# Patient Record
Sex: FEMALE | Race: WHITE
[De-identification: names, ages, dates, MRNs, and addresses within clinical notes are randomized per-mention and may not be internally consistent; named-entity substitution may affect disease eponyms.]

---

## 2018-06-29 ENCOUNTER — HOSPITAL ENCOUNTER (OUTPATIENT)
Age: 63
Setting detail: OBSERVATION
LOS: 1 days | Discharge: HOME | End: 2018-06-30

## 2018-06-29 ENCOUNTER — HOSPITAL ENCOUNTER (OUTPATIENT)
Dept: HOSPITAL 91 - SDS | Age: 63
Setting detail: OBSERVATION
LOS: 1 days | Discharge: HOME | End: 2018-06-30
Payer: COMMERCIAL

## 2018-06-29 DIAGNOSIS — D05.12: Primary | ICD-10-CM

## 2018-06-29 LAB
ADD MAN DIFF?: NO
ALANINE AMINOTRANSFERASE: 22 IU/L (ref 13–69)
ALBUMIN/GLOBULIN RATIO: 1.13
ALBUMIN: 4.2 G/DL (ref 3.3–4.9)
ALKALINE PHOSPHATASE: 66 IU/L (ref 42–121)
ANION GAP: 16 (ref 8–16)
ASPARTATE AMINO TRANSFERASE: 22 IU/L (ref 15–46)
BASOPHIL #: 0 10^3/UL (ref 0–0.1)
BASOPHILS %: 0.3 % (ref 0–2)
BILIRUBIN,DIRECT: 0 MG/DL (ref 0–0.2)
BILIRUBIN,TOTAL: 0.8 MG/DL (ref 0.2–1.3)
BLOOD UREA NITROGEN: 15 MG/DL (ref 7–20)
CALCIUM: 9.1 MG/DL (ref 8.4–10.2)
CARBON DIOXIDE: 25 MMOL/L (ref 21–31)
CHLORIDE: 108 MMOL/L (ref 97–110)
CREATININE: 0.52 MG/DL (ref 0.44–1)
EOSINOPHILS #: 0.1 10^3/UL (ref 0–0.5)
EOSINOPHILS %: 1.3 % (ref 0–7)
GLOBULIN: 3.7 G/DL (ref 1.3–3.2)
GLUCOSE: 86 MG/DL (ref 70–220)
HEMATOCRIT: 37.2 % (ref 37–47)
HEMOGLOBIN: 12.9 G/DL (ref 12–16)
HOLD TRANSMISSIONS: 1
INR: 0.93
LYMPHOCYTES #: 1.2 10^3/UL (ref 0.8–2.9)
LYMPHOCYTES %: 31.5 % (ref 15–51)
MEAN CORPUSCULAR HEMOGLOBIN: 31.3 PG (ref 29–33)
MEAN CORPUSCULAR HGB CONC: 34.7 G/DL (ref 32–37)
MEAN CORPUSCULAR VOLUME: 90.3 FL (ref 82–101)
MEAN PLATELET VOLUME: 10.2 FL (ref 7.4–10.4)
MONOCYTE #: 0.3 10^3/UL (ref 0.3–0.9)
MONOCYTES %: 7.6 % (ref 0–11)
NEUTROPHIL #: 2.3 10^3/UL (ref 1.6–7.5)
NEUTROPHILS %: 59 % (ref 39–77)
NUCLEATED RED BLOOD CELLS #: 0 10^3/UL (ref 0–0)
NUCLEATED RED BLOOD CELLS%: 0 /100WBC (ref 0–0)
PARTIAL THROMBOPLASTIN TIME: 27.5 SEC (ref 25–35)
PLATELET COUNT: 202 10^3/UL (ref 140–415)
POTASSIUM: 3.5 MMOL/L (ref 3.5–5.1)
PROTIME: 12.5 SEC (ref 11.9–14.9)
PT RATIO: 1
RED BLOOD COUNT: 4.12 10^6/UL (ref 4.2–5.4)
RED CELL DISTRIBUTION WIDTH: 12.2 % (ref 11.5–14.5)
SODIUM: 145 MMOL/L (ref 135–144)
TOTAL PROTEIN: 7.9 G/DL (ref 6.1–8.1)
WHITE BLOOD COUNT: 3.8 10^3/UL (ref 4.8–10.8)

## 2018-06-29 PROCEDURE — 85610 PROTHROMBIN TIME: CPT

## 2018-06-29 PROCEDURE — 88342 IMHCHEM/IMCYTCHM 1ST ANTB: CPT

## 2018-06-29 PROCEDURE — 88307 TISSUE EXAM BY PATHOLOGIST: CPT

## 2018-06-29 PROCEDURE — 88331 PATH CONSLTJ SURG 1 BLK 1SPC: CPT

## 2018-06-29 PROCEDURE — 19301 PARTIAL MASTECTOMY: CPT

## 2018-06-29 PROCEDURE — 93005 ELECTROCARDIOGRAM TRACING: CPT

## 2018-06-29 PROCEDURE — 71045 X-RAY EXAM CHEST 1 VIEW: CPT

## 2018-06-29 PROCEDURE — 85730 THROMBOPLASTIN TIME PARTIAL: CPT

## 2018-06-29 PROCEDURE — 85025 COMPLETE CBC W/AUTO DIFF WBC: CPT

## 2018-06-29 PROCEDURE — 80053 COMPREHEN METABOLIC PANEL: CPT

## 2018-06-29 RX ADMIN — HYDROMORPHONE HYDROCHLORIDE 1 MG: 2 INJECTION INTRAMUSCULAR; INTRAVENOUS; SUBCUTANEOUS at 19:37

## 2018-06-29 RX ADMIN — ONDANSETRON HYDROCHLORIDE 1 MG: 2 INJECTION, SOLUTION INTRAMUSCULAR; INTRAVENOUS at 19:37

## 2018-06-29 RX ADMIN — CEFAZOLIN SODIUM 1 MLS/HR: 2 SOLUTION INTRAVENOUS at 06:00

## 2018-06-29 RX ADMIN — HYDROMORPHONE HYDROCHLORIDE 1 MG: 2 INJECTION INTRAMUSCULAR; INTRAVENOUS; SUBCUTANEOUS at 19:09

## 2018-06-29 RX ADMIN — DEXTROSE, SODIUM CHLORIDE, AND POTASSIUM CHLORIDE 1 MLS/HR: 5; .45; .15 INJECTION INTRAVENOUS at 21:11

## 2018-06-29 RX ADMIN — THIAMINE HYDROCHLORIDE 1 MLS/HR: 100 INJECTION, SOLUTION INTRAMUSCULAR; INTRAVENOUS at 19:20

## 2018-06-29 RX ADMIN — THIAMINE HYDROCHLORIDE 1 MLS/HR: 100 INJECTION, SOLUTION INTRAMUSCULAR; INTRAVENOUS at 06:00

## 2018-06-29 RX ADMIN — MORPHINE SULFATE 1 MG: 2 INJECTION, SOLUTION INTRAMUSCULAR; INTRAVENOUS at 21:14

## 2018-06-30 RX ADMIN — DEXTROSE, SODIUM CHLORIDE, AND POTASSIUM CHLORIDE 1 MLS/HR: 5; .45; .15 INJECTION INTRAVENOUS at 10:28

## 2018-06-30 RX ADMIN — MORPHINE SULFATE 1 MG: 2 INJECTION, SOLUTION INTRAMUSCULAR; INTRAVENOUS at 04:52

## 2018-06-30 RX ADMIN — DEXTROSE, SODIUM CHLORIDE, AND POTASSIUM CHLORIDE 1 MLS/HR: 5; .45; .15 INJECTION INTRAVENOUS at 02:28

## 2018-06-30 RX ADMIN — MORPHINE SULFATE 1 MG: 2 INJECTION, SOLUTION INTRAMUSCULAR; INTRAVENOUS at 10:50

## 2018-06-30 RX ADMIN — DEXTROSE, SODIUM CHLORIDE, AND POTASSIUM CHLORIDE 1 MLS/HR: 5; .45; .15 INJECTION INTRAVENOUS at 04:53

## 2018-09-13 ENCOUNTER — HOSPITAL ENCOUNTER (OUTPATIENT)
Age: 63
Discharge: HOME | End: 2018-09-13

## 2018-09-13 ENCOUNTER — HOSPITAL ENCOUNTER (OUTPATIENT)
Dept: HOSPITAL 91 - SDS | Age: 63
Discharge: HOME | End: 2018-09-13
Payer: COMMERCIAL

## 2018-09-13 DIAGNOSIS — C50.912: Primary | ICD-10-CM

## 2018-09-13 PROCEDURE — 36561 INSERT TUNNELED CV CATH: CPT

## 2018-09-13 PROCEDURE — 71045 X-RAY EXAM CHEST 1 VIEW: CPT

## 2018-09-13 PROCEDURE — 93306 TTE W/DOPPLER COMPLETE: CPT

## 2018-09-13 PROCEDURE — 76942 ECHO GUIDE FOR BIOPSY: CPT

## 2018-09-13 RX ADMIN — THIAMINE HYDROCHLORIDE 1 MLS/HR: 100 INJECTION, SOLUTION INTRAMUSCULAR; INTRAVENOUS at 11:00

## 2018-09-13 RX ADMIN — CEFAZOLIN 1 ML/HR: 1 INJECTION, POWDER, FOR SOLUTION INTRAMUSCULAR; INTRAVENOUS at 11:30

## 2018-09-13 RX ADMIN — CEFAZOLIN 1 MLS/HR: 1 INJECTION, POWDER, FOR SOLUTION INTRAMUSCULAR; INTRAVENOUS at 09:00

## 2018-09-13 RX ADMIN — FENTANYL CITRATE 1 MCG: 50 INJECTION, SOLUTION INTRAMUSCULAR; INTRAVENOUS at 12:00

## 2018-09-13 RX ADMIN — HEPARIN SODIUM IN SODIUM CHLORIDE 1: 200 INJECTION INTRAVENOUS at 12:15

## 2018-09-13 RX ADMIN — MIDAZOLAM HYDROCHLORIDE 1 MG: 2 INJECTION, SOLUTION INTRAMUSCULAR; INTRAVENOUS at 12:10

## 2018-09-13 RX ADMIN — BACITRACIN 1 ML: 50000 INJECTION, POWDER, FOR SOLUTION INTRAMUSCULAR at 12:00

## 2018-09-13 RX ADMIN — HEPARIN SODIUM 1 UNIT: 1000 INJECTION, SOLUTION INTRAVENOUS; SUBCUTANEOUS at 12:30

## 2018-09-13 RX ADMIN — LIDOCAINE HYDROCHLORIDE 1 ML: 10; .005 INJECTION, SOLUTION EPIDURAL; INFILTRATION; INTRACAUDAL; PERINEURAL at 12:10

## 2019-05-30 NOTE — PREOPHP
DATE OF ADMISSION: 05/31/2019

 

HISTORY OF PRESENT ILLNESS:  The patient is a 64-year-old female in overall good

health who was scheduled to undergo a left modified radical mastectomy and right

total mastectomy.  The patient presented last year with invasive ductal 

carcinoma of the left breast and on 06/29/2018 underwent left partial mastectomy

with negative sentinel lymph node for metastatic disease.  She has been 

receiving Herceptin every 3 weeks.  She received postoperative radiation 

therapy.  The patient had recent followup imaging showing there are residual 

calcifications with a span of approximately 7 cm in the left breast which has 

been operated on.  After appropriate consultations, decision and recommendation 

was made for left modified radical mastectomy and because the patient has a 

strong family history of breast cancer, she requested bilateral mastectomy to be

performed without reconstruction.

 

PAST MEDICAL HISTORY:

MEDICATIONS:  Herceptin every 3 weeks.

 

ALLERGIES:  NONE.

 

PAST SURGICAL HISTORY:  In addition to the above 15 years ago, she underwent 

hysterectomy and remotely, she underwent appendectomy.

 

PHYSICAL EXAMINATION:

VITAL SIGNS:  The patient is 5-foot 3 inches, 135 pounds.  Vital signs are 

within normal limits.

HEENT:  Within normal limits.

CHEST:  Clear.

HEART:  Regular rate, rhythm.

BREASTS:  Large and ptotic.  There is mild residual radiation dermatitis of the 

left breast.  There is no palpable mass or any enlarged axillary, 

supraclavicular lymph nodes.

ABDOMEN:  Soft.

PELVIC AND RECTAL:  Per primary care.

EXTREMITIES:  Without edema.

NEUROLOGIC:  Physiologic.

 

ASSESSMENT:

1.  Invasive ductal carcinoma of left breast, status post left partial 

mastectomy and negative sentinel node biopsy in 06/2018, now with extensive 

residual calcifications.

2.  Strong family history of breast cancer.

 

PLAN:  Left modified radical mastectomy and right total mastectomy without 

reconstruction.  Full discussion has been had with the patient regarding the 

nature of the condition, the nature of the surgery, indications, alternatives, 

options and risks including bleeding, infection, ischemia of flaps, injury to 

adjacent structures or organs, need for additional procedures and treatments  

based on final pathology, need for drains, etc. All questions have been 

answered.  She understands and agrees to proceed.

 

 

Dictated By: JOSH SALGADO/FLORINA

DD:    05/30/2019 12:04:37

DT:    05/30/2019 13:21:21

Conf#: [id number]

DID#:  [id number]

 

DINORAH

## 2019-05-31 ENCOUNTER — HOSPITAL ENCOUNTER (INPATIENT)
Dept: HOSPITAL 91 - REC | Age: 64
LOS: 2 days | Discharge: HOME | DRG: 581 | End: 2019-06-02
Payer: COMMERCIAL

## 2019-05-31 ENCOUNTER — HOSPITAL ENCOUNTER (INPATIENT)
Dept: HOSPITAL 10 - REC | Age: 64
LOS: 2 days | Discharge: HOME | DRG: 581 | End: 2019-06-02
Attending: SURGERY | Admitting: SURGERY
Payer: COMMERCIAL

## 2019-05-31 VITALS — SYSTOLIC BLOOD PRESSURE: 130 MMHG | HEART RATE: 79 BPM | RESPIRATION RATE: 22 BRPM | DIASTOLIC BLOOD PRESSURE: 66 MMHG

## 2019-05-31 VITALS — RESPIRATION RATE: 14 BRPM | DIASTOLIC BLOOD PRESSURE: 70 MMHG | SYSTOLIC BLOOD PRESSURE: 107 MMHG | HEART RATE: 56 BPM

## 2019-05-31 VITALS — DIASTOLIC BLOOD PRESSURE: 59 MMHG | RESPIRATION RATE: 12 BRPM | HEART RATE: 56 BPM | SYSTOLIC BLOOD PRESSURE: 103 MMHG

## 2019-05-31 VITALS — DIASTOLIC BLOOD PRESSURE: 63 MMHG | RESPIRATION RATE: 16 BRPM | SYSTOLIC BLOOD PRESSURE: 116 MMHG | HEART RATE: 61 BPM

## 2019-05-31 VITALS — RESPIRATION RATE: 17 BRPM | SYSTOLIC BLOOD PRESSURE: 114 MMHG | HEART RATE: 59 BPM | DIASTOLIC BLOOD PRESSURE: 61 MMHG

## 2019-05-31 VITALS — DIASTOLIC BLOOD PRESSURE: 53 MMHG | SYSTOLIC BLOOD PRESSURE: 100 MMHG | RESPIRATION RATE: 19 BRPM | HEART RATE: 60 BPM

## 2019-05-31 VITALS — SYSTOLIC BLOOD PRESSURE: 116 MMHG | HEART RATE: 58 BPM | DIASTOLIC BLOOD PRESSURE: 68 MMHG | RESPIRATION RATE: 16 BRPM

## 2019-05-31 VITALS — SYSTOLIC BLOOD PRESSURE: 106 MMHG | RESPIRATION RATE: 12 BRPM | HEART RATE: 62 BPM | DIASTOLIC BLOOD PRESSURE: 63 MMHG

## 2019-05-31 VITALS — SYSTOLIC BLOOD PRESSURE: 107 MMHG | HEART RATE: 58 BPM | RESPIRATION RATE: 13 BRPM | DIASTOLIC BLOOD PRESSURE: 61 MMHG

## 2019-05-31 VITALS — DIASTOLIC BLOOD PRESSURE: 59 MMHG | HEART RATE: 59 BPM | SYSTOLIC BLOOD PRESSURE: 98 MMHG | RESPIRATION RATE: 17 BRPM

## 2019-05-31 VITALS — DIASTOLIC BLOOD PRESSURE: 68 MMHG | RESPIRATION RATE: 12 BRPM | HEART RATE: 62 BPM | SYSTOLIC BLOOD PRESSURE: 106 MMHG

## 2019-05-31 VITALS — SYSTOLIC BLOOD PRESSURE: 76 MMHG | DIASTOLIC BLOOD PRESSURE: 58 MMHG | RESPIRATION RATE: 16 BRPM | HEART RATE: 64 BPM

## 2019-05-31 VITALS — SYSTOLIC BLOOD PRESSURE: 110 MMHG | DIASTOLIC BLOOD PRESSURE: 55 MMHG | HEART RATE: 62 BPM | RESPIRATION RATE: 16 BRPM

## 2019-05-31 VITALS — DIASTOLIC BLOOD PRESSURE: 68 MMHG | SYSTOLIC BLOOD PRESSURE: 115 MMHG | RESPIRATION RATE: 15 BRPM | HEART RATE: 59 BPM

## 2019-05-31 VITALS — DIASTOLIC BLOOD PRESSURE: 66 MMHG | HEART RATE: 54 BPM | RESPIRATION RATE: 14 BRPM | SYSTOLIC BLOOD PRESSURE: 116 MMHG

## 2019-05-31 VITALS — HEART RATE: 65 BPM | SYSTOLIC BLOOD PRESSURE: 66 MMHG | RESPIRATION RATE: 16 BRPM | DIASTOLIC BLOOD PRESSURE: 48 MMHG

## 2019-05-31 VITALS — RESPIRATION RATE: 23 BRPM | DIASTOLIC BLOOD PRESSURE: 67 MMHG | HEART RATE: 70 BPM | SYSTOLIC BLOOD PRESSURE: 127 MMHG

## 2019-05-31 VITALS — RESPIRATION RATE: 16 BRPM | HEART RATE: 62 BPM | DIASTOLIC BLOOD PRESSURE: 59 MMHG | SYSTOLIC BLOOD PRESSURE: 112 MMHG

## 2019-05-31 VITALS — RESPIRATION RATE: 16 BRPM | SYSTOLIC BLOOD PRESSURE: 113 MMHG | DIASTOLIC BLOOD PRESSURE: 65 MMHG | HEART RATE: 58 BPM

## 2019-05-31 VITALS — DIASTOLIC BLOOD PRESSURE: 60 MMHG | RESPIRATION RATE: 10 BRPM | SYSTOLIC BLOOD PRESSURE: 98 MMHG | HEART RATE: 52 BPM

## 2019-05-31 VITALS — HEART RATE: 63 BPM | DIASTOLIC BLOOD PRESSURE: 58 MMHG | RESPIRATION RATE: 16 BRPM | SYSTOLIC BLOOD PRESSURE: 110 MMHG

## 2019-05-31 VITALS — RESPIRATION RATE: 16 BRPM | HEART RATE: 65 BPM | SYSTOLIC BLOOD PRESSURE: 67 MMHG | DIASTOLIC BLOOD PRESSURE: 39 MMHG

## 2019-05-31 VITALS — RESPIRATION RATE: 16 BRPM | SYSTOLIC BLOOD PRESSURE: 103 MMHG | DIASTOLIC BLOOD PRESSURE: 62 MMHG | HEART RATE: 65 BPM

## 2019-05-31 VITALS
BODY MASS INDEX: 24.49 KG/M2 | HEIGHT: 63 IN | BODY MASS INDEX: 24.49 KG/M2 | HEIGHT: 63 IN | WEIGHT: 138.23 LBS | WEIGHT: 138.23 LBS

## 2019-05-31 VITALS — DIASTOLIC BLOOD PRESSURE: 67 MMHG | SYSTOLIC BLOOD PRESSURE: 118 MMHG | RESPIRATION RATE: 16 BRPM | HEART RATE: 62 BPM

## 2019-05-31 VITALS — DIASTOLIC BLOOD PRESSURE: 60 MMHG | HEART RATE: 56 BPM | RESPIRATION RATE: 10 BRPM | SYSTOLIC BLOOD PRESSURE: 116 MMHG

## 2019-05-31 VITALS — RESPIRATION RATE: 24 BRPM | SYSTOLIC BLOOD PRESSURE: 106 MMHG | DIASTOLIC BLOOD PRESSURE: 57 MMHG | HEART RATE: 59 BPM

## 2019-05-31 VITALS — HEART RATE: 74 BPM | RESPIRATION RATE: 13 BRPM | SYSTOLIC BLOOD PRESSURE: 125 MMHG | DIASTOLIC BLOOD PRESSURE: 61 MMHG

## 2019-05-31 VITALS — HEART RATE: 72 BPM | SYSTOLIC BLOOD PRESSURE: 107 MMHG | RESPIRATION RATE: 12 BRPM | DIASTOLIC BLOOD PRESSURE: 68 MMHG

## 2019-05-31 VITALS — HEART RATE: 73 BPM | DIASTOLIC BLOOD PRESSURE: 69 MMHG | RESPIRATION RATE: 16 BRPM | SYSTOLIC BLOOD PRESSURE: 103 MMHG

## 2019-05-31 DIAGNOSIS — C50.912: Primary | ICD-10-CM

## 2019-05-31 DIAGNOSIS — Z80.3: ICD-10-CM

## 2019-05-31 DIAGNOSIS — Z90.710: ICD-10-CM

## 2019-05-31 DIAGNOSIS — Z40.01: ICD-10-CM

## 2019-05-31 LAB
ADD MAN DIFF?: NO
ALANINE AMINOTRANSFERASE: 20 IU/L (ref 13–69)
ALBUMIN/GLOBULIN RATIO: 1.13
ALBUMIN: 4.1 G/DL (ref 3.3–4.9)
ALKALINE PHOSPHATASE: 50 IU/L (ref 42–121)
ANION GAP: 7 (ref 5–13)
ASPARTATE AMINO TRANSFERASE: 21 IU/L (ref 15–46)
BASOPHIL #: 0 10^3/UL (ref 0–0.1)
BASOPHILS %: 0.4 % (ref 0–2)
BILIRUBIN,DIRECT: 0 MG/DL (ref 0–0.2)
BILIRUBIN,TOTAL: 0.7 MG/DL (ref 0.2–1.3)
BLOOD UREA NITROGEN: 16 MG/DL (ref 7–20)
CALCIUM: 9.3 MG/DL (ref 8.4–10.2)
CARBON DIOXIDE: 29 MMOL/L (ref 21–31)
CHLORIDE: 107 MMOL/L (ref 97–110)
CREATININE: 0.48 MG/DL (ref 0.44–1)
EOSINOPHILS #: 0 10^3/UL (ref 0–0.5)
EOSINOPHILS %: 1.5 % (ref 0–7)
GLOBULIN: 3.6 G/DL (ref 1.3–3.2)
GLUCOSE: 89 MG/DL (ref 70–220)
HEMATOCRIT: 35.8 % (ref 37–47)
HEMOGLOBIN: 12.2 G/DL (ref 12–16)
HOLD TRANSMISSIONS: 1
IMMATURE GRANS #M: 0 10^3/UL (ref 0–0.03)
IMMATURE GRANS % (M): 0 % (ref 0–0.43)
INR: 0.89
LYMPHOCYTES #: 0.9 10^3/UL (ref 0.8–2.9)
LYMPHOCYTES %: 32.6 % (ref 15–51)
MEAN CORPUSCULAR HEMOGLOBIN: 31.1 PG (ref 29–33)
MEAN CORPUSCULAR HGB CONC: 34.1 G/DL (ref 32–37)
MEAN CORPUSCULAR VOLUME: 91.3 FL (ref 82–101)
MEAN PLATELET VOLUME: 10 FL (ref 7.4–10.4)
MONOCYTE #: 0.3 10^3/UL (ref 0.3–0.9)
MONOCYTES %: 10.6 % (ref 0–11)
NEUTROPHIL #: 1.5 10^3/UL (ref 1.6–7.5)
NEUTROPHILS %: 54.9 % (ref 39–77)
NUCLEATED RED BLOOD CELLS #: 0 10^3/UL (ref 0–0)
NUCLEATED RED BLOOD CELLS%: 0 /100WBC (ref 0–0)
PARTIAL THROMBOPLASTIN TIME: 26.9 SEC (ref 23–35)
PLATELET COUNT: 194 10^3/UL (ref 140–415)
POTASSIUM: 4.1 MMOL/L (ref 3.5–5.1)
PROTIME: 12.1 SEC (ref 11.9–14.9)
PT RATIO: 0.9
RED BLOOD COUNT: 3.92 10^6/UL (ref 4.2–5.4)
RED CELL DISTRIBUTION WIDTH: 12.4 % (ref 11.5–14.5)
SODIUM: 143 MMOL/L (ref 135–144)
TOTAL PROTEIN: 7.7 G/DL (ref 6.1–8.1)
WHITE BLOOD COUNT: 2.7 10^3/UL (ref 4.8–10.8)

## 2019-05-31 PROCEDURE — 0HTV0ZZ RESECTION OF BILATERAL BREAST, OPEN APPROACH: ICD-10-PCS

## 2019-05-31 PROCEDURE — 85025 COMPLETE CBC W/AUTO DIFF WBC: CPT

## 2019-05-31 PROCEDURE — 85610 PROTHROMBIN TIME: CPT

## 2019-05-31 PROCEDURE — 85730 THROMBOPLASTIN TIME PARTIAL: CPT

## 2019-05-31 PROCEDURE — 93005 ELECTROCARDIOGRAM TRACING: CPT

## 2019-05-31 PROCEDURE — 07T60ZZ RESECTION OF LEFT AXILLARY LYMPHATIC, OPEN APPROACH: ICD-10-PCS

## 2019-05-31 PROCEDURE — 80053 COMPREHEN METABOLIC PANEL: CPT

## 2019-05-31 PROCEDURE — 71045 X-RAY EXAM CHEST 1 VIEW: CPT

## 2019-05-31 PROCEDURE — 0HTV0ZZ RESECTION OF BILATERAL BREAST, OPEN APPROACH: ICD-10-PCS | Performed by: SURGERY

## 2019-05-31 PROCEDURE — 88307 TISSUE EXAM BY PATHOLOGIST: CPT

## 2019-05-31 PROCEDURE — 80048 BASIC METABOLIC PNL TOTAL CA: CPT

## 2019-05-31 PROCEDURE — 07T60ZZ RESECTION OF LEFT AXILLARY LYMPHATIC, OPEN APPROACH: ICD-10-PCS | Performed by: SURGERY

## 2019-05-31 RX ADMIN — FENTANYL CITRATE 1 MCG: 50 INJECTION, SOLUTION INTRAMUSCULAR; INTRAVENOUS at 14:33

## 2019-05-31 RX ADMIN — THIAMINE HYDROCHLORIDE 1 MLS/HR: 100 INJECTION, SOLUTION INTRAMUSCULAR; INTRAVENOUS at 09:54

## 2019-05-31 RX ADMIN — CEFAZOLIN SODIUM 1 MLS/HR: 2 SOLUTION INTRAVENOUS at 08:00

## 2019-05-31 RX ADMIN — DEXTROSE, SODIUM CHLORIDE, AND POTASSIUM CHLORIDE SCH MLS/HR: 5; .45; .15 INJECTION INTRAVENOUS at 23:43

## 2019-05-31 RX ADMIN — DEXTROSE, SODIUM CHLORIDE, AND POTASSIUM CHLORIDE 1 MLS/HR: 5; .45; .15 INJECTION INTRAVENOUS at 23:43

## 2019-05-31 RX ADMIN — FENTANYL CITRATE 1 MCG: 50 INJECTION, SOLUTION INTRAMUSCULAR; INTRAVENOUS at 14:44

## 2019-05-31 RX ADMIN — CEFAZOLIN SODIUM 1 MLS/HR: 2 SOLUTION INTRAVENOUS at 18:44

## 2019-05-31 RX ADMIN — DEXTROSE, SODIUM CHLORIDE, AND POTASSIUM CHLORIDE 1 MLS/HR: 5; .45; .15 INJECTION INTRAVENOUS at 18:22

## 2019-05-31 RX ADMIN — HYDROCODONE BITARTRATE AND ACETAMINOPHEN PRN TAB: 5; 325 TABLET ORAL at 19:24

## 2019-05-31 RX ADMIN — HYDROCODONE BITARTRATE AND ACETAMINOPHEN 1 TAB: 5; 325 TABLET ORAL at 19:24

## 2019-05-31 RX ADMIN — DEXTROSE, SODIUM CHLORIDE, AND POTASSIUM CHLORIDE SCH MLS/HR: 5; .45; .15 INJECTION INTRAVENOUS at 18:22

## 2019-05-31 NOTE — PREAC
Date/Time of Note


Date/Time of Note


DATE: 5/31/19 


TIME: 09:46





Anesthesia Eval and Record


Evaluation


Time Pre-Procedure Interview


DATE: 5/31/19 


TIME: 09:46


Age


64


Sex


female


NPO:  8 hrs


Preoperative diagnosis


left breast  ca


Planned procedure


bilateral mastectomy





Past Medical History


Past Medical History:  None





Surgery & Anesthesia Issues


No known issue





Meds


Anticoagulation:  No


Beta Blocker within 24 hr:  No


Reason Beta Blocker not given:  Pt. not on B-Blocker


No Active Prescriptions or Reported Meds





Current Medications


Sodium Chloride 1,000 ml @  75 mls/hr [id number] IV ;  Start 5/31/19 at 08:00;  


Stop 5/31/19 at 21:19


Meds reviewed:  Yes





Allergies


Coded Allergies:  


     No Known Allergies (Verified  Allergy, Unknown, 5/31/19)


Allergies Reviewed:  Yes





Labs/Studies


Labs Reviewed:  Reviewed by anesthesiologist





Pregnancy test:  N/A





Pre-procedure Exam


Last vitals





Vital Signs


  Date      Temp  Pulse  Resp  B/P (MAP)   Pulse Ox  O2          O2 Flow    FiO2


Time                                                 Delivery    Rate


   5/31/19  97.9     73    16      103/69        98  Room Air


     09:33                           (80)





Airway:  Adequate mouth opening, Adequate thyromental dist


Mallampati:  Mallampati I


Teeth:  Normal


Lung:  Normal


Heart:  Normal





ASA Physical Status


ASA physical status:  2


Emergency:  None





Planned Anesthetic


General/MAC:  LMA





Planned Pain Management


Parenteral pain med





Pre-operative Attestations


Prior to commencing anesthesia and surgery, the patient was re-evaluated, there 


was verification of:


*The patient's identity


*The results of appropriate recent lab work and preoperative vital signs


*The above evaluation not changing prior to induction


*Anesthetic plan, risk benefits, alternative and complications discussed with 


patient/family; questions answered; patient/family understands, accepts and 


wishes to proceed.











LINDA SEO                 May 31, 2019 09:48

## 2019-05-31 NOTE — HPN
Date/Time of Note


Date/Time of Note


DATE: 5/31/19 


TIME: 08:48





Interval H&P Admission Note


Pt. seen H&P reviewed:  No system changes











JOSH SHELTON                 May 31, 2019 08:49

## 2019-05-31 NOTE — PAC
Date/Time of Note


Date/Time of Note


DATE: 5/31/19 


TIME: 13:48





Post-Anesthesia Notes


Post-Anesthesia Note


Last documented vital signs





Vital Signs


  Date      Temp  Pulse  Resp  B/P (MAP)   Pulse Ox  O2          O2 Flow    FiO2


Time                                                 Delivery    Rate


   5/31/19  97.9     73    16      103/69        98  Room Air


      1348                           (80)





Activity:  WNL


Respiratory function:  WNL


Cardiovascular function:  WNL


Mental status:  Baseline


Pain reasonably controlled:  Yes


Hydration appropriate:  Yes


Nausea/Vomiting absent:  Yes











LINDA SEO                 May 31, 2019 13:49

## 2019-05-31 NOTE — OPR
DATE OF OPERATION:  05/31/2019

 

 

SURGEON:  Josh Newman MD

 

ASSISTANT SURGEON:  Don Barney MD

 

ANESTHESIOLOGIST:  Rui Denise MD

 

TYPE OF ANESTHESIA:  General.

 

PREOPERATIVE DIAGNOSES:

1.  History of carcinoma, left breast with recurrent extensive suspicious 

microcalcifications.

2.  Strong family history of breast cancer.

 

POSTOPERATIVE DIAGNOSES:

1.  History of carcinoma, left breast with recurrent extensive suspicious 

microcalcifications.

2.  Strong family history of breast cancer.

 

OPERATION PERFORMED:

1.  Left modified radical mastectomy.

2.  Right total mastectomy.

 

INDICATIONS:  The patient underwent surgery in June 2018 involving left partial 

mastectomy for invasive ductal carcinoma, with sentinel lymph node biopsy which 

was negative.  The patient subsequently received treatment including radiation 

therapy and is receiving Herceptin.  Recent imaging studies showed a 7 cm span 

of residual suspicious calcifications with the recommendation for left 

mastectomy.  In view of the patient's strong family history of breast cancer, 

she requested bilateral mastectomy.

 

DESCRIPTION OF PROCEDURE:  The patient was taken to the operating room and under

general anesthesia, with sequential compression device stockings in place, she 

was prepped and draped in the usual fashion, incorporating the left upper 

extremity into the sterile field.  Attention was first directed to the left 

breast.  A transversely oriented elliptical incision was made, incorporating the

nipple areolar complex, but avoiding excessive resection to avoid tension on the

flaps in view of the past treatment with radiation and mild radiation 

dermatitis.  The flaps were dissected circumferentially and the breast resected 

including pectoralis fascia to the costal margin, clavicle, latissimus dorsi and

sternum.  After the breast was left attached only by the axillary tail, a lower 

level axillary dissection using the LigaSure electrosurgical device was 

performed. The specimen was given off the field for pathology after orienting it

with suture markers superior and medial.  The field was irrigated and hemostasis

carefully achieved with cautery and LigaSure.  Through separate stab incisions 

inferolaterally, 2 large flat Delta-Amezquita drains were placed, 1 into the 

axilla, 1 under the flaps and each sutured to the skin with a 2-0 silk skin 

suture.  After ascertaining that hemostasis was secure, the incision was closed 

with interrupted 2-0 Vicryl deep dermal subcutaneous sutures, followed by 

staples.  Attention was then directed to the right breast after changing gloves 

and a similar operation performed without any axillary node dissection.  The 

specimen was oriented for the pathologist and given off the field.  Hemostasis 

was carefully achieved after irrigation. Two drains were placed.  Closure was in

the same fashion as the left side.  Dry sterile dressings were applied. Dry 

sterile dressings were applied.  Final sponge and needle counts were correct.  

The patient tolerated the procedure well and left the operating room in good 

condition.

 

 

Dictated By: JOSH SALGADO/FLORINA

DD:    05/31/2019 13:57:37

DT:    05/31/2019 16:32:38

Conf#: [id number]

DID#:  [id number]

 

MTDD

## 2019-05-31 NOTE — SIPON
Date/Time of Note


Date/Time of Note


DATE: 5/31/19 


TIME: 13:41





Operative Report


Preoperative Diagnosis


carcinoma left breast with new extensive calcifications; family history of 


breast cancer


Postoperative Diagnosis


same


Operation/Procedure Performed


left modified radical mastectomy, right total mastectomy


Surgeon


see signature line


First assist


Don Barney MD


Anesthesia:  general


Estimated blood loss:  50 - 100 ml's


Transfusion Required


   none


Specimen


left breast with axillary contents; right breast


Grafts/Implants


none


Complications


none











JOSH SHELTON                 May 31, 2019 13:43

## 2019-06-01 VITALS — SYSTOLIC BLOOD PRESSURE: 100 MMHG | DIASTOLIC BLOOD PRESSURE: 69 MMHG | RESPIRATION RATE: 18 BRPM | HEART RATE: 70 BPM

## 2019-06-01 VITALS — DIASTOLIC BLOOD PRESSURE: 58 MMHG | HEART RATE: 72 BPM | RESPIRATION RATE: 18 BRPM | SYSTOLIC BLOOD PRESSURE: 103 MMHG

## 2019-06-01 VITALS — HEART RATE: 66 BPM | SYSTOLIC BLOOD PRESSURE: 122 MMHG | DIASTOLIC BLOOD PRESSURE: 71 MMHG | RESPIRATION RATE: 18 BRPM

## 2019-06-01 VITALS — HEART RATE: 81 BPM | RESPIRATION RATE: 20 BRPM | SYSTOLIC BLOOD PRESSURE: 100 MMHG | DIASTOLIC BLOOD PRESSURE: 74 MMHG

## 2019-06-01 LAB
ABNORMAL IP MESSAGE: 1
ADD MAN DIFF?: NO
ANION GAP: 6 (ref 5–13)
BASOPHIL #: 0 10^3/UL (ref 0–0.1)
BASOPHILS %: 0.2 % (ref 0–2)
BLOOD UREA NITROGEN: 12 MG/DL (ref 7–20)
CALCIUM: 8.5 MG/DL (ref 8.4–10.2)
CARBON DIOXIDE: 26 MMOL/L (ref 21–31)
CHLORIDE: 108 MMOL/L (ref 97–110)
CREATININE: 0.52 MG/DL (ref 0.44–1)
EOSINOPHILS #: 0 10^3/UL (ref 0–0.5)
EOSINOPHILS %: 0 % (ref 0–7)
GLUCOSE: 130 MG/DL (ref 70–220)
HEMATOCRIT: 31.3 % (ref 37–47)
HEMOGLOBIN: 11 G/DL (ref 12–16)
IMMATURE GRANS #M: 0.02 10^3/UL (ref 0–0.03)
IMMATURE GRANS % (M): 0.3 % (ref 0–0.43)
LYMPHOCYTES #: 0.6 10^3/UL (ref 0.8–2.9)
LYMPHOCYTES %: 9.3 % (ref 15–51)
MEAN CORPUSCULAR HEMOGLOBIN: 31.8 PG (ref 29–33)
MEAN CORPUSCULAR HGB CONC: 35.1 G/DL (ref 32–37)
MEAN CORPUSCULAR VOLUME: 90.5 FL (ref 82–101)
MEAN PLATELET VOLUME: 10.1 FL (ref 7.4–10.4)
MONOCYTE #: 0.5 10^3/UL (ref 0.3–0.9)
MONOCYTES %: 7.2 % (ref 0–11)
NEUTROPHIL #: 5.2 10^3/UL (ref 1.6–7.5)
NEUTROPHILS %: 83 % (ref 39–77)
NUCLEATED RED BLOOD CELLS #: 0 10^3/UL (ref 0–0)
NUCLEATED RED BLOOD CELLS%: 0 /100WBC (ref 0–0)
PLATELET COUNT: 190 10^3/UL (ref 140–415)
POSITIVE DIFF: (no result)
POTASSIUM: 4 MMOL/L (ref 3.5–5.1)
RED BLOOD COUNT: 3.46 10^6/UL (ref 4.2–5.4)
RED CELL DISTRIBUTION WIDTH: 12.1 % (ref 11.5–14.5)
SODIUM: 140 MMOL/L (ref 135–144)
WHITE BLOOD COUNT: 6.2 10^3/UL (ref 4.8–10.8)

## 2019-06-01 RX ADMIN — DEXTROSE, SODIUM CHLORIDE, AND POTASSIUM CHLORIDE 1 MLS/HR: 5; .45; .15 INJECTION INTRAVENOUS at 22:31

## 2019-06-01 RX ADMIN — CEFAZOLIN SODIUM 1 MLS/HR: 2 SOLUTION INTRAVENOUS at 03:59

## 2019-06-01 RX ADMIN — DEXTROSE, SODIUM CHLORIDE, AND POTASSIUM CHLORIDE 1 MLS/HR: 5; .45; .15 INJECTION INTRAVENOUS at 06:33

## 2019-06-01 RX ADMIN — DEXTROSE, SODIUM CHLORIDE, AND POTASSIUM CHLORIDE SCH MLS/HR: 5; .45; .15 INJECTION INTRAVENOUS at 22:31

## 2019-06-01 RX ADMIN — DEXTROSE, SODIUM CHLORIDE, AND POTASSIUM CHLORIDE SCH MLS/HR: 5; .45; .15 INJECTION INTRAVENOUS at 06:33

## 2019-06-01 RX ADMIN — HYDROCODONE BITARTRATE AND ACETAMINOPHEN PRN TAB: 5; 325 TABLET ORAL at 13:14

## 2019-06-01 RX ADMIN — CEFAZOLIN SODIUM 1 MLS/HR: 2 SOLUTION INTRAVENOUS at 10:51

## 2019-06-01 RX ADMIN — HYDROCODONE BITARTRATE AND ACETAMINOPHEN 1 TAB: 5; 325 TABLET ORAL at 13:14

## 2019-06-01 NOTE — PN
Date/Time of Note


Date/Time of Note


DATE: 6/1/19 


TIME: 08:44





Assessment/Plan


Lines/Catheters


IV Catheter Type (from Nrsg):  Peripheral IV


Casillas in Place (from Nrsg):  No





Subjective


Detailed Summary


Free Text/Dictation


AVSS Ambulates to bathroom without difficulty. Able to eat small amount last 


night so still getting IV fluids. Using Norco for pain


Bilateral mastectomy incisions are clean and dry, flaps well perfused.


Drains overnight 12 hours: 20+20+20+35 = 105cc serosang (not bloody)


WBC 6200 (pre-op 2700)


Hgb 11 BMP-wnl


Imp. Stable post bilateral mastectomy


Plan: To remain in hospital another day


         d/c fluids if able to tolerate po fluids.


         teach patient care of drains


         Rx given for Norco 5/325 #30 and Keflex 500mg q6h #20 for family to 


fill today


         anticipate discharge tomorrow if stable and stable CBC, drain outputs


         f/u CBC in AM





Exam/Review of Systems


Vital Signs


Vitals





Vital Signs


  Date      Temp  Pulse  Resp  B/P (MAP)   Pulse Ox  O2          O2 Flow    FiO2


Time                                                 Delivery    Rate


    6/1/19  98.2     72    18      103/58        94


     07:18                           (73)


    6/1/19                                           Nasal


     02:03                                           Cannula


   5/31/19                                                             2.0


     16:00








Intake and Output





5/31/19 5/31/19 6/1/19





1515:00


23:00


07:00





IntakeIntake Total


1400 ml


600 ml


950 ml





OutputOutput Total


95 ml


435 ml


445 ml





BalanceBalance


1305 ml


165 ml


505 ml














Results


Result Diagram:  


6/1/19 0449                                                                     


          6/1/19 0449














JOSH SHELTON                  Jun 1, 2019 08:48

## 2019-06-02 VITALS — RESPIRATION RATE: 18 BRPM | HEART RATE: 62 BPM | DIASTOLIC BLOOD PRESSURE: 63 MMHG | SYSTOLIC BLOOD PRESSURE: 105 MMHG

## 2019-06-02 VITALS — DIASTOLIC BLOOD PRESSURE: 72 MMHG | SYSTOLIC BLOOD PRESSURE: 102 MMHG | HEART RATE: 80 BPM | RESPIRATION RATE: 18 BRPM

## 2019-06-02 LAB
ADD MAN DIFF?: NO
BASOPHIL #: 0 10^3/UL (ref 0–0.1)
BASOPHILS %: 0.3 % (ref 0–2)
EOSINOPHILS #: 0 10^3/UL (ref 0–0.5)
EOSINOPHILS %: 0.9 % (ref 0–7)
HEMATOCRIT: 30.4 % (ref 37–47)
HEMOGLOBIN: 10.6 G/DL (ref 12–16)
IMMATURE GRANS #M: 0 10^3/UL (ref 0–0.03)
IMMATURE GRANS % (M): 0 % (ref 0–0.43)
LYMPHOCYTES #: 1.3 10^3/UL (ref 0.8–2.9)
LYMPHOCYTES %: 38.6 % (ref 15–51)
MEAN CORPUSCULAR HEMOGLOBIN: 32.1 PG (ref 29–33)
MEAN CORPUSCULAR HGB CONC: 34.9 G/DL (ref 32–37)
MEAN CORPUSCULAR VOLUME: 92.1 FL (ref 82–101)
MEAN PLATELET VOLUME: 9.8 FL (ref 7.4–10.4)
MONOCYTE #: 0.3 10^3/UL (ref 0.3–0.9)
MONOCYTES %: 9.6 % (ref 0–11)
NEUTROPHIL #: 1.7 10^3/UL (ref 1.6–7.5)
NEUTROPHILS %: 50.6 % (ref 39–77)
NUCLEATED RED BLOOD CELLS #: 0 10^3/UL (ref 0–0)
NUCLEATED RED BLOOD CELLS%: 0 /100WBC (ref 0–0)
PLATELET COUNT: 152 10^3/UL (ref 140–415)
RED BLOOD COUNT: 3.3 10^6/UL (ref 4.2–5.4)
RED CELL DISTRIBUTION WIDTH: 12.8 % (ref 11.5–14.5)
WHITE BLOOD COUNT: 3.4 10^3/UL (ref 4.8–10.8)

## 2019-06-02 RX ADMIN — MAGNESIUM HYDROXIDE 1 ML: 400 SUSPENSION ORAL at 13:02

## 2019-06-02 RX ADMIN — DEXTROSE, SODIUM CHLORIDE, AND POTASSIUM CHLORIDE SCH MLS/HR: 5; .45; .15 INJECTION INTRAVENOUS at 03:01

## 2019-06-02 RX ADMIN — DEXTROSE, SODIUM CHLORIDE, AND POTASSIUM CHLORIDE 1 MLS/HR: 5; .45; .15 INJECTION INTRAVENOUS at 03:01

## 2019-06-02 RX ADMIN — MINERAL OIL 1 ML: 1000 SOLUTION ORAL at 13:02

## 2019-06-02 RX ADMIN — HYDROCODONE BITARTRATE AND ACETAMINOPHEN 1 TAB: 5; 325 TABLET ORAL at 03:43

## 2019-06-02 RX ADMIN — HYDROCODONE BITARTRATE AND ACETAMINOPHEN PRN TAB: 5; 325 TABLET ORAL at 03:43

## 2019-06-02 NOTE — PN
DATE:  06/02/2019

 

 

Postop day #2 status post bilateral mastectomy for the cancer.

 

SUBJECTIVE:  Does not have any specific complaint.  Pain is under control, has been out of bed and wa
lk around, tolerated diet.

 

OBJECTIVE:

GENERAL:  Alert, awake, oriented.

VITAL SIGNS:  Temperature 98.6, heart rate 80, respirations 18, blood pressure 105/63, saturation 93%
 on room air.

HEART:  Regular.

LUNGS:  Clear.

ABDOMEN:  Soft.

CHEST:  Dressing is intact, dry.

 

INPUT AND OUTPUT:  There are 4 Delta-Amezquita drains, all of them were inspected.  They are draining s
erous fluid.

 

PLAN:  The patient is stable; therefore, the patient can be discharged home.  Instruction for the car
e of the Delta-Amezquita drain was given to the patient.  The patient is to call and go and visit Dr. SUSANA leroy coming Wednesday in the office and take with her the recorded amount of the drainage on piece
 of paper to the office.  Prescription for Norco and Keflex has been written by Dr. Newman and has 
been given to patient's daughter.  All questions were answered.

 

 

Dictated By: JOSEPH FLYNN MD

 

PS/NTS

DD:    06/02/2019 13:11:43

DT:    06/02/2019 15:21:53

Conf#: [id number]

DID#:  [id number]

CC: JOSH NEWMAN MD;*EndCC*

## 2019-06-03 NOTE — RADRPT
Vent Rate: 72 bpm

RR Interval: 840 msec

NJ Interval: 160 msec

QRS Duration: 86 msec

QT Interval: 377 msec

QTC Interval: 411 msec

P-R-T Axis: 52 - 44 - 48 degrees

 

Sinus rhythm...normal

 

Electronically Signed By: Constantin Flood

## 2019-06-21 ENCOUNTER — HOSPITAL ENCOUNTER (OUTPATIENT)
Dept: HOSPITAL 91 - EKG | Age: 64
Discharge: HOME | End: 2019-06-21
Payer: COMMERCIAL

## 2019-06-21 ENCOUNTER — HOSPITAL ENCOUNTER (OUTPATIENT)
Dept: HOSPITAL 10 - EKG | Age: 64
Discharge: HOME | End: 2019-06-21
Attending: INTERNAL MEDICINE
Payer: COMMERCIAL

## 2019-06-21 DIAGNOSIS — C50.412: Primary | ICD-10-CM

## 2019-06-21 PROCEDURE — 93306 TTE W/DOPPLER COMPLETE: CPT

## 2019-06-23 NOTE — RADRPT
Echocardiogram Report

 

 

Patient Name: ANGELY CARVERPatient ID: [id number]

: 1955 (64y 1m)Study Date: 2019 10:56:28 AM

Gender: FAccession #: [id number]

Tech: LE                                    Location: [facility]

Ref.Physician: ANNITA RIVAS            Height(Cm):            

 

BSA: Weight(Kg):

Quality: GoodOrder Physician: ANNITA RIVAS

Account #:

 

 

Procedures:

Echocardiographic Report:

Transthoracic echocardiogram with complete 2D, M-Mode, and doppler 

examination.

 

Indications:

Evaluate Left Ventricular function, breast cancer.

 

 

Measurements:

2D/M Mode                                   Doppler                                               

Measurement    Value    Normal Range        Measurement      Value    Normal Range                

LVIDd 2D       4.4      [ 3.8 - 5.2 ] cm    YOVANA Vmax         2.1      [ 2.0 - 4.0 ] cm2           

LVIDs 2D       3.2      [ 2.2 - 3.5 ] cm    AV Mean David      0.8      [ 70.0 - 90.0 ] cm/sec      

LVPWd 2D       1.0      [ 0.6 - 0.9 ] cm    AV Mean PG       3.0      [ 2.0 - 4.0 ] mmHg          

IVSd 2D        0.9      [ 0.6 - 0.9 ] cm    AV Peak David      1.2      [ 100.0 - 170.0 ] cm/sec    

IVS/LVPW 2D    0.9      ratio               AV Peak PG       6.0      [ 2.0 - 9.0 ] mmHg          

LVOT Area      2.5      cm2                 AV VTI           25.0     cm                          

                                            LVOT Peak David    1.0      [ 70.0 - 110.0 ] cm/sec     

                                            LVOT Peak PG     4.0      [ 2.0 - 6.0 ] mmHg          

                                            MV E Peak David    1.0      [ 60.0 - 130.0 ] cm/sec     

                                            MV A Peak David    0.7      [ 100.0 - 120.0 ] cm/sec    

                                            MV E/A           1.4      [ 0.8 - 1.5 ] ratio         

                                            MV Decel Time    194      [ 104 - 258 ] msec          

                                            Lat E` David       0.1      [ 10.0 - 15.0 ] cm/sec      

                                            Med E` David       0.1      cm/sec                      

                                            MV E/A           1.4      [ 0.8 - 1.5 ] ratio         

                                            TR Peak David      3.0      [ 100.0 - 280.0 ] cm/sec    

                                            TR Peak PG       37.0     mmHg                        

                                            PV Peak David      0.7      [ 40.0 - 80.0 ] cm/sec      

                                            PV Peak PG       2.0      mmHg                        

 

Findings:

Left Ventricle:

Normal left ventricular systolic function. Normal left ventricular 

cavity size. Normal left ventricular wall thickness. Ejection fraction 

is visually estimated at 55 %. Tissue Doppler/Mitral Doppler indices are 

within normal limits.

Right Ventricle:

Normal right ventricular size. Normal right ventricular systolic 

function.

Left Atrium:

The left atrium is normal in size.

Right Atrium:

The right atrium is normal in size.

Mitral Valve:

Normal appearance of the mitral valve. Normal appearance and function of 

the mitral valve with trace physiologic regurgitation.

Aortic Valve:

Normal appearance of the aortic valve. No significant aortic stenosis or 

insufficiency.

Tricuspid Valve:

Normal appearance of the tricuspid valve. Right ventricular systolic 

pressure is consistent with mild pulmonary hypertension. The estimated 

Peak RVSP is 40 mmHg. There is mild to moderate tricuspid regurgitation.

Pulmonic Valve:

Normal pulmonic valve appearance.

Pericardium:

Normal pericardium with no significant pericardial effusion.

Aorta:

Normal aortic root.

IVC:

Normal size and normal respiratory collapse consistent with normal right 

atrial pressure.

 

 

Conclusions:

 Normal left ventricular systolic function. Normal left ventricular 

cavity size. Normal left ventricular wall thickness. Ejection fraction 

is visually estimated at 55 %. Tissue Doppler/Mitral Doppler indices are 

within normal limits.

 There is mild to moderate tricuspid regurgitation.

 Right ventricular systolic pressure is consistent with mild pulmonary 

hypertension. The estimated Peak RVSP is 40 mmHg.

 Normal size and normal respiratory collapse consistent with normal 

right atrial pressure.

 

Electronically Signed By:

 

Lance Renteria

2019 11:04:47 PDT